# Patient Record
Sex: FEMALE | URBAN - METROPOLITAN AREA
[De-identification: names, ages, dates, MRNs, and addresses within clinical notes are randomized per-mention and may not be internally consistent; named-entity substitution may affect disease eponyms.]

---

## 2017-07-06 PROBLEM — Z00.00 ENCOUNTER FOR PREVENTIVE HEALTH EXAMINATION: Status: ACTIVE | Noted: 2017-07-06

## 2017-07-07 ENCOUNTER — OUTPATIENT (OUTPATIENT)
Dept: OUTPATIENT SERVICES | Facility: HOSPITAL | Age: 37
LOS: 1 days | End: 2017-07-07
Payer: COMMERCIAL

## 2017-07-07 ENCOUNTER — APPOINTMENT (OUTPATIENT)
Dept: RADIOLOGY | Facility: CLINIC | Age: 37
End: 2017-07-07

## 2017-07-07 DIAGNOSIS — Z00.8 ENCOUNTER FOR OTHER GENERAL EXAMINATION: ICD-10-CM

## 2017-07-07 PROCEDURE — 72050 X-RAY EXAM NECK SPINE 4/5VWS: CPT

## 2017-11-20 ENCOUNTER — TRANSCRIPTION ENCOUNTER (OUTPATIENT)
Age: 37
End: 2017-11-20

## 2019-02-22 ENCOUNTER — EMERGENCY (EMERGENCY)
Facility: HOSPITAL | Age: 39
LOS: 0 days | Discharge: HOME | End: 2019-02-22
Attending: EMERGENCY MEDICINE | Admitting: EMERGENCY MEDICINE
Payer: COMMERCIAL

## 2019-02-22 VITALS
WEIGHT: 149.91 LBS | TEMPERATURE: 98 F | HEART RATE: 65 BPM | SYSTOLIC BLOOD PRESSURE: 122 MMHG | RESPIRATION RATE: 17 BRPM | DIASTOLIC BLOOD PRESSURE: 89 MMHG | HEIGHT: 65 IN | OXYGEN SATURATION: 99 %

## 2019-02-22 DIAGNOSIS — R55 SYNCOPE AND COLLAPSE: ICD-10-CM

## 2019-02-22 PROCEDURE — 99284 EMERGENCY DEPT VISIT MOD MDM: CPT

## 2019-02-22 NOTE — ED STATDOCS - OBJECTIVE STATEMENT
40 y/o female with no relevant PMHx presents to the ED c/o s/p syncope today. Pt states she hit her left knee on the corner of the desk, had severe knee pain and then suddenly had dizziness and a witnessed syncopal episode. Colleague that witnessed event states she hit her head on the way down with LOC for 20 seconds. Pt denies any HA, fever, nausea, vomiting, diarrhea, chest pain, SOB, lightheadedness at this time.

## 2019-02-22 NOTE — ED ADULT TRIAGE NOTE - CHIEF COMPLAINT QUOTE
pt hit her knee on a desk at work ,synopsized and hit her head on her desk. lasting less than 20 seconds. C/O head pain. no anticoagulation

## 2019-02-22 NOTE — ED STATDOCS - CLINICAL SUMMARY MEDICAL DECISION MAKING FREE TEXT BOX
Vasovagal syncope. Patient reassured, advised bed rest today. Return to ED in event of worsening symptoms. Follow up with PCP Vasovagal syncope. No concern for intracranial injury based on exam.  No signs of extremity injury on exam.  Patient reassured, advised bed rest today. Return to ED in event of worsening symptoms. Follow up with PCP

## 2019-02-22 NOTE — ED STATDOCS - PROGRESS NOTE DETAILS
40 y/o F with no PMH presents s/p syncopal episode at work apx 1 hour ago. Pt states she bumped her left knee against the corner of her desk, shortly after which she felt lightheaded and passed out. States the next thing she recalls after hitting her knee is waking up on the floor surrounding by her coworkers. Fall witnessed by coworkers, report +head trauma. Pt denies HA, blurry vision, numbness/tingling, difficulty walking, nausea, vomiting, SOB, dizziness since episode. PE: Well appearing. Neuro: CN II-XII intact. Sensation intact to light touch in all extremities. 5/5 strength in all extremities. Patient ambulatory with no deviation in gait. Cardiac: s1/s2, RRR. Lungs: CTAB. HEENT: NC/AT. TM well visualized bilaterally. No Raccoon or Romo signs. oral mucosa clear. Dentition in good repair. A/P: Vasovagal syncope. Patient reassured, advised bed rest today. Return to ED in event of worsening symptoms. Follow up with PCP. - Jude Evans PA-C

## 2019-10-22 NOTE — ED ADULT TRIAGE NOTE - NS ED NURSE DIRECT TO ROOM YN
----- Message from Chris Hinkle M.D. sent at 10/18/2019  6:39 PM PDT -----  Call pt with normal results.    
Phone Number Called: [phone] (home)       Call outcome: spoke to patient regarding message below    Message: Pt. Notified of normal results   
Yes

## 2019-12-16 NOTE — ED STATDOCS - DISCHARGE REVIEW MATERIAL PRESENTED
You can access the FollowMyHealth Patient Portal offered by [facility] by registering at the following website: [email or web address]. By joining [employer]’s FollowMyHealth portal, you will also be able to view your health information using other applications (apps) compatible with our system.
.

## 2025-05-18 ENCOUNTER — NON-APPOINTMENT (OUTPATIENT)
Age: 45
End: 2025-05-18